# Patient Record
Sex: MALE | Race: WHITE | NOT HISPANIC OR LATINO
[De-identification: names, ages, dates, MRNs, and addresses within clinical notes are randomized per-mention and may not be internally consistent; named-entity substitution may affect disease eponyms.]

---
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
## 2021-05-04 NOTE — ED PROVIDER NOTE - PMH
Addended by: CELSO GARDNER on: 5/4/2021 02:17 PM     Modules accepted: Orders    
Seizure    Unspecified dislocation of unspecified patella, initial encounter

## 2022-10-06 ENCOUNTER — APPOINTMENT (OUTPATIENT)
Dept: PLASTIC SURGERY | Facility: CLINIC | Age: 22
End: 2022-10-06

## 2022-10-06 VITALS
SYSTOLIC BLOOD PRESSURE: 129 MMHG | WEIGHT: 239 LBS | OXYGEN SATURATION: 98 % | DIASTOLIC BLOOD PRESSURE: 82 MMHG | HEART RATE: 78 BPM | HEIGHT: 72 IN | BODY MASS INDEX: 32.37 KG/M2 | TEMPERATURE: 97.1 F

## 2022-10-06 PROCEDURE — 99204 OFFICE O/P NEW MOD 45 MIN: CPT

## 2022-10-06 RX ORDER — OXCARBAZEPINE 600 MG/1
TABLET, FILM COATED ORAL
Refills: 0 | Status: ACTIVE | COMMUNITY

## 2022-10-06 NOTE — CONSULT LETTER
[Dear  ___] : Dear  [unfilled], [Consult Letter:] : I had the pleasure of evaluating your patient, [unfilled]. [Please see my note below.] : Please see my note below. [Consult Closing:] : Thank you very much for allowing me to participate in the care of this patient.  If you have any questions, please do not hesitate to contact me. [Sincerely,] : Sincerely, [FreeTextEntry3] : Remy Hay MD, FACS

## 2022-10-06 NOTE — REASON FOR VISIT
[Consultation] : a consultation visit [Parent] : parent [FreeTextEntry1] : Dr. Vicky Gross (Dermatology)

## 2022-10-13 ENCOUNTER — LABORATORY RESULT (OUTPATIENT)
Age: 22
End: 2022-10-13

## 2022-10-13 ENCOUNTER — APPOINTMENT (OUTPATIENT)
Dept: PLASTIC SURGERY | Facility: CLINIC | Age: 22
End: 2022-10-13

## 2022-10-13 VITALS
HEIGHT: 72 IN | SYSTOLIC BLOOD PRESSURE: 136 MMHG | TEMPERATURE: 98 F | DIASTOLIC BLOOD PRESSURE: 78 MMHG | BODY MASS INDEX: 32.37 KG/M2 | OXYGEN SATURATION: 98 % | WEIGHT: 239 LBS | HEART RATE: 82 BPM

## 2022-10-13 PROCEDURE — 21011 EXC FACE LES SC <2 CM: CPT

## 2022-10-13 PROCEDURE — 14040 TIS TRNFR F/C/C/M/N/A/G/H/F: CPT

## 2022-10-13 RX ORDER — CLINDAMYCIN HYDROCHLORIDE 300 MG/1
300 CAPSULE ORAL 3 TIMES DAILY
Qty: 9 | Refills: 0 | Status: ACTIVE | COMMUNITY
Start: 2022-10-13 | End: 1900-01-01

## 2022-10-20 ENCOUNTER — APPOINTMENT (OUTPATIENT)
Dept: PLASTIC SURGERY | Facility: CLINIC | Age: 22
End: 2022-10-20

## 2022-10-20 VITALS
TEMPERATURE: 98.5 F | SYSTOLIC BLOOD PRESSURE: 127 MMHG | DIASTOLIC BLOOD PRESSURE: 69 MMHG | HEIGHT: 72 IN | HEART RATE: 81 BPM | WEIGHT: 239 LBS | BODY MASS INDEX: 32.37 KG/M2 | OXYGEN SATURATION: 99 %

## 2022-10-20 DIAGNOSIS — L72.0 EPIDERMAL CYST: ICD-10-CM

## 2022-10-20 PROCEDURE — 99024 POSTOP FOLLOW-UP VISIT: CPT

## 2022-11-02 NOTE — REASON FOR VISIT
[Procedure: _________] : a [unfilled] procedure visit [FreeTextEntry1] : here today for excisional biopsy and closure of a forehead cyst

## 2022-11-02 NOTE — PROCEDURE
[Nl] : None [FreeTextEntry1] : Forehead mass [FreeTextEntry2] : Excision of forehead mass 1.5cm, local advancement flap <10sqcm [FreeTextEntry6] : After the area was prepped and draped, the area was infiltrated with 1%lidocaine with epi. The mass in question was marked and incision was made around it. The mass was dissected from the surrounding tissue. This was removed with the overlying skin. It measured 1.5cm.This was sent to pathology.\par \par The wound was then irrigated, and given size and location of the wound, a local flap was marked. The flap was incised and elevated. The flap was then advanced into the area providing a good reconstruction. The flap was then inset with 4-0 vicryl for the dermis and 4-0 nylon for the skin. The total area including the wound was less than 10sq cm. A dressing was applied. Pt tolerated well procedure.\par  [FreeTextEntry7] : forehead mass

## 2022-12-23 PROBLEM — L72.0 EPIDERMAL CYST OF FACE: Status: ACTIVE | Noted: 2022-10-06

## 2024-08-02 NOTE — ED PROVIDER NOTE - CROS ED [facility] ALL NEG
Patient seen in office today for: left knee injection given 3/20/24   Left knee osteoarthritis   Pt wants injectioin before she leaves for a long trip     Date of last injection: 3/20/24    Patient reports /10 pain.  RICE and medication are effective to alleviate pain and reduce swelling.   Pain also alleviated by: OTC meds as needed   Pain worsened by: Patient reports painful ROM & weight bearing.       Patient is interested in injection today     - - -